# Patient Record
Sex: FEMALE | Race: WHITE
[De-identification: names, ages, dates, MRNs, and addresses within clinical notes are randomized per-mention and may not be internally consistent; named-entity substitution may affect disease eponyms.]

---

## 2016-11-23 NOTE — HP
DATE OF ADMISSION:  2016

 

Patient to be admitted to the [facility] on 2016.  

 

HISTORY:  This is a 40-year-old woman who only recently had developed a every severe

bout of lower chest and epigastric discomfort radiating to her back and associated

with nausea and vomiting.  This led to an evaluation at [facility], which

included a CT scan of the abdomen and pelvis as well as an abdominal ultrasound. 

Ultrasound evaluation completed 2016, demonstrated a distended

gallbladder with a mobile gallstone.  Gallbladder wall measured 1 to 2 mm in

thickness.  There was no appreciable pericholecystic fluid at that time.  The biliary

ductal structures were of normal caliber.  CT evaluation added  no additional

pertinent information.

 

On close questioning, the patient has had multiple bouts of right upper quadrant mild

discomfort in the past.  On close questioning, she does have a fatty food intolerance

by history  and did consume a significantly large fatty meal the night she became

very symptomatic.  There has been no unintentional weight loss of history of

jaundice.

 

The patient's past medical history is significant for hypertension, GERD, and

underlying thyroid disorder.  No history of hypercholesterolemia; heart disease;

diabetes; respiratory, renal or hepatic insufficiency.

 

Past surgical history significant for  section in .

 

ALLERGIES:  None known.

 

REGULAR MEDICATIONS:  Losartan; Synthroid; Lasix 20 mg ,  and

.

 

SOCIAL HISTORY:  Negative tobacco, negative alcohol.

 

FAMILY HISTORY:  Father age 75, history of diabetes, hypercholesterolemia,

hypertension.  Mother, history of CVA at age 35.  Siblings well, one with an

underlying thyroid disorder.

 

REVIEW OF SYSTEMS:  Nil.

 

PHYSICAL EXAMINATION:  Abdomen obese, soft, nontender.  No palpable abnormalities.

 

IMPRESSION:  Symptomatic cholelithiasis/chronic cholecystitis.

 

PLAN:  Laparoscopic cholecystectomy/possible open.

 

Indications, alternatives, possible complications reviewed.  Consent obtained.

 

Patient to be seen preoperatively by Dr. Rubin.  Please refer to his notes for those

medical details. 

 

 

SYLVESTER VALDEZ M.D.

 

[id number]

DD: 2016 18:04

DT: 2016 20:30

Job #:  [id number]

cc:  Vladislav Rubin MD

## 2017-02-01 ENCOUNTER — HOSPITAL ENCOUNTER (OUTPATIENT)
Dept: HOSPITAL 74 - FASU | Age: 42
LOS: 1 days | Discharge: HOME | End: 2017-02-02
Attending: SURGERY
Payer: COMMERCIAL

## 2017-02-01 VITALS — BODY MASS INDEX: 44.4 KG/M2

## 2017-02-01 DIAGNOSIS — K80.10: Primary | ICD-10-CM

## 2017-02-01 PROCEDURE — 0FT44ZZ RESECTION OF GALLBLADDER, PERCUTANEOUS ENDOSCOPIC APPROACH: ICD-10-PCS | Performed by: SURGERY

## 2017-02-01 RX ADMIN — FAMOTIDINE SCH MLS/HR: 20 INJECTION, SOLUTION INTRAVENOUS at 21:26

## 2017-02-01 NOTE — OP
DATE OF OPERATION:  02/01/2017 

 

PREOPERATIVE DIAGNOSIS:  Chronic cholecystitis/cholelithiasis.

 

POSTOPERATIVE DIAGNOSIS:  Chronic cholecystitis/cholelithiasis.

 

PROCEDURE PERFORMED:  Laparoscopic cholecystectomy.

 

OPERATING SURGEON:  Brock Chappell MD

 

FIRST ASSISTANT:  Thierry Barbosa MD

 

ANESTHESIA: General. Dr. Magdalene Ocampo 

 

HISTORY:  A 40-year-old woman who presents to the hospital with chronic

cholecystitis/cholelithiasis/biliary colic.  The patient for laparoscopic

cholecystectomy.

 

Indications, alternatives, possible complications were reviewed and consent was

obtained.

 

PROCEDURE IN DETAIL: With the patient in the supine position, after general

anesthesia, the abdomen was prepped and draped in a sterile fashion using

chlorhexidine.  Small infraumbilical transverse incision was made through which 
a

Veress needle was placed into the abdominal cavity.  The cavity was inflated to 
an

adequate pressure and volume using CO2 gas.  

 

The Veress needle was removed.  An 11-mm trocar port was place through the 
umbilical

wound. The cameras lens was passed through this port and the intraabdominal 
cavity

was visualized.  Under direct visualization, two 5-mm right anterolateral ports 
were

placed through which clamps were passed to maintain traction on the gallbladder 
and

aid in the dissection.  An 11-mm port was placed in the epigastrium in the 
midline. 

The operating instruments were passed through this port.

 

Limited exploration of the abdomen, particularly considering the large extent of

intraabdominal fat, revealed no significant findings other than adhesions about 
the

gallbladder.  These were taken down under direct vision exposing the entire

gallbladder and hepatoduodenal ligament.  

 

The hepatoduodenal ligament was incised.  The cystic duct was identified.  The 
cystic

duct bile duct junction was noted.  The cystic duct was clipped proximally and

distally and divided.  The adjacent cystic artery was managed similarly.  

 

The gallbladder was then removed from the gallbladder bed using the 
electrocautery

device.  Ultimately, the gallbladder was disconnected.  The bed was inspected 
and

adequate hemostasis noted.  All ports sites were visualize and the ports removed

under direct vision noted at the port sites.  

 

Ultimately the gallbladder was passed through the umbilical port and delivered

without difficulty.  

 

The fascia at the level of the umbilicus was closed using interrupted No. 1 
Vicryl

suture.  All skin wounds were closed using subcuticular 4-0 Biosyn sutures.  

 

INSTRUMENT COUNTS:  Correct.

 

ESTIMATED BLOOD LOSS:  Minimal.

 

SPECIMEN:  Gallbladder. 

 

DRAINS:  None.

 

The patient tolerated the procedure and the procedure was terminated.  

 

 

MADDIE [id number]

DD: 02/01/2017 12:10

DT: 02/01/2017 12:43

Job #:  [id number]

MTDD

## 2017-02-02 VITALS — SYSTOLIC BLOOD PRESSURE: 114 MMHG | DIASTOLIC BLOOD PRESSURE: 67 MMHG | HEART RATE: 86 BPM | TEMPERATURE: 97.3 F

## 2017-02-02 RX ADMIN — FAMOTIDINE SCH MLS/HR: 20 INJECTION, SOLUTION INTRAVENOUS at 09:32

## 2017-02-03 NOTE — PATH
Surgical Pathology Report



Patient Name:  YOMI PIZANO

Accession #:  

Med. Rec. #:  [id number]                                                        

   /Age/Gender:  1975 (Age: 41) / F

Account:  [id number]                                                          

             Location: [facility] 

Taken:  2017

Received:  2017

Reported:  2/3/2017

Physicians:  Brock Chappell M.D.

  



Specimen(s) Received

 GALLBLADDER 





Clinical History

Calculus of gallbladder with chronic cholecystitis







Final Diagnosis

GALLBLADDER, CHOLECYSTECTOMY:

CHRONIC CHOLECYSTITIS AND CHOLELITHIASIS.





***Electronically Signed***

Giovanni Lee M.D.





Gross Description

Received in formalin, labeled "gallbladder," is a 9.0 x 3.5 x 3.3 cm.

gallbladder with a 0.3 cm. in length portion of cystic duct attached. The outer

surface is tan grey and varies from smooth to shaggy. The lumen contains green,

tenacious bile as well as 2 yellow, irregular choleliths measuring 0.4 and 1.7

cm in greatest dimension. The mucosa is green and velvety. The wall of the

gallbladder averages 0.1 cm. in thickness. Representative sections are submitted

in one cassette.

## 2017-03-09 ENCOUNTER — HOSPITAL ENCOUNTER (EMERGENCY)
Dept: HOSPITAL 74 - JER | Age: 42
Discharge: HOME | End: 2017-03-09
Payer: COMMERCIAL

## 2017-03-09 VITALS — BODY MASS INDEX: 43.8 KG/M2

## 2017-03-09 VITALS — DIASTOLIC BLOOD PRESSURE: 70 MMHG | HEART RATE: 100 BPM | SYSTOLIC BLOOD PRESSURE: 140 MMHG | TEMPERATURE: 98.1 F

## 2017-03-09 DIAGNOSIS — N39.0: ICD-10-CM

## 2017-03-09 DIAGNOSIS — E66.01: ICD-10-CM

## 2017-03-09 DIAGNOSIS — I10: ICD-10-CM

## 2017-03-09 DIAGNOSIS — E87.6: ICD-10-CM

## 2017-03-09 DIAGNOSIS — K52.9: Primary | ICD-10-CM

## 2017-03-09 DIAGNOSIS — E03.9: ICD-10-CM

## 2017-03-09 LAB
ALBUMIN SERPL-MCNC: 3.6 G/DL (ref 3.4–5)
ALP SERPL-CCNC: 108 U/L (ref 45–117)
ALT SERPL-CCNC: 34 U/L (ref 12–78)
ANION GAP SERPL CALC-SCNC: 12 MMOL/L (ref 8–16)
APPEARANCE UR: (no result)
AST SERPL-CCNC: 25 U/L (ref 15–37)
BASOPHILS # BLD: 0.5 % (ref 0–2)
BILIRUB SERPL-MCNC: 0.5 MG/DL (ref 0.2–1)
BILIRUB UR STRIP.AUTO-MCNC: NEGATIVE MG/DL
CALCIUM SERPL-MCNC: 9 MG/DL (ref 8.5–10.1)
CO2 SERPL-SCNC: 29 MMOL/L (ref 21–32)
COLOR UR: YELLOW
CREAT SERPL-MCNC: 0.5 MG/DL (ref 0.55–1.02)
DEPRECATED RDW RBC AUTO: 14.2 % (ref 11.6–15.6)
EOSINOPHIL # BLD: 1.2 % (ref 0–4.5)
GLUCOSE SERPL-MCNC: 92 MG/DL (ref 74–106)
KETONES UR QL STRIP: (no result)
LEUKOCYTE ESTERASE UR QL STRIP.AUTO: (no result)
MCH RBC QN AUTO: 25.6 PG (ref 25.7–33.7)
MCHC RBC AUTO-ENTMCNC: 33.5 G/DL (ref 32–36)
MCV RBC: 76.5 FL (ref 80–96)
MUCOUS THREADS URNS QL MICRO: (no result)
NEUTROPHILS # BLD: 71.6 % (ref 42.8–82.8)
NITRITE UR QL STRIP: NEGATIVE
PH UR: 7 [PH] (ref 5–8)
PLATELET # BLD AUTO: 210 K/MM3 (ref 134–434)
PMV BLD: 8.2 FL (ref 7.5–11.1)
PROT SERPL-MCNC: 7.5 G/DL (ref 6.4–8.2)
PROT UR QL STRIP: NEGATIVE
PROT UR QL STRIP: NEGATIVE
RBC # BLD AUTO: 4 /HPF (ref 0–3)
RBC # UR STRIP: (no result) /UL
SP GR UR: 1.02 (ref 1–1.03)
UROBILINOGEN UR STRIP-MCNC: NEGATIVE E.U./DL (ref 0.2–1)
WBC # BLD AUTO: 8.7 K/MM3 (ref 4–10)
WBC # UR AUTO: 18 /HPF (ref 3–5)

## 2017-03-09 PROCEDURE — 3E033GC INTRODUCTION OF OTHER THERAPEUTIC SUBSTANCE INTO PERIPHERAL VEIN, PERCUTANEOUS APPROACH: ICD-10-PCS

## 2017-03-09 NOTE — PDOC
Rapid Medical Evaluation


Chief Complaint: Nausea/Vomiting


Time Seen by Provider: 03/09/17 16:20


Medical Evaluation: 


 Allergies











Allergy/AdvReac Type Severity Reaction Status Date / Time


 


No Known Allergies Allergy   Verified 03/09/17 16:20














03/09/17 16:25


I have performed a brief in-person evaluation of this patient.


The patient presents with a chief complaint of: epigastric pain x 2 days, 

intermittent, + nausea/diarrhea, recent cholecystectomy


Pertinent physical exam findings:+ epigastric tenderness


I have ordered the following: cbc, cmp, lipase, abd/pelvis CT


The patient will proceed to the ED for further evaluation.

## 2017-03-09 NOTE — PDOC
History of Present Illness





- General


History Source: Patient, Old Records


Exam Limitations: No Limitations





- History of Present Illness


Initial Comments: 


17 17:46


41-year-old female with past medical history of hypertension, hypothyroidism, 

obesity, status post cholecystectomy in early February at [facility] sent in 

by her primary care physician Dr. Rubin for CT scan of the abdomen pelvis. The 

patient reports 2 days of intermittent sharp epigastric pain. Associated with 

some nausea but denies vomiting. Reports several loose stools but denies 

fevers. Denies sick contacts. Came into the ER to rule out potentially retained 

stone.





<Casey Amaro - Last Filed: 17 22:11>





- General


History Source: Patient


Exam Limitations: No Limitations





<Refugio Potts - Last Filed: 17 22:29>





- General


Chief Complaint: Pain, Acute


Stated Complaint: SENT BY PCP


Time Seen by Provider: 17 16:20





Past History





<Casey Amaro - Last Filed: 17 22:11>





- Past Medical History


Anemia: No


Asthma: No


Cancer: No


Cardiac Disorders: No


CVA: No


COPD: No


CHF: No


Dementia: No


Diabetes: No


GI Disorders: No


 Disorders: No


HTN: Yes (DX )


Hypercholesterolemia: Yes (DIET CONTROLLED)


Liver Disease: No


Seizures: No


Thyroid Disease: Yes (HYPOTHYROIDISM)





- Surgical History


Abdominal Surgery: Yes (- )


Appendectomy: No


Cardiac Surgery: No


Cholecystectomy: No


Lung Surgery: No


Neurologic Surgery: No


Orthopedic Surgery: No





- Immunization History


Immunization Up to Date: Yes





- Psycho/Social/Smoking Cessation Hx


Anxiety: No


Suicidal Ideation: No


Smoking Status: No


Smoking History: Never smoked


Have you smoked in the past 12 months: No


Number of Cigarettes Smoked Daily: 0


Hx Alcohol Use: No


Drug/Substance Use Hx: No


Substance Use Type: None


Hx Substance Use Treatment: No





<Refugio Potts - Last Filed: 17 22:29>





- Past Medical History


Allergies/Adverse Reactions: 


 Allergies











Allergy/AdvReac Type Severity Reaction Status Date / Time


 


No Known Allergies Allergy   Verified 17 16:20











Home Medications: 


Ambulatory Orders





Furosemide 20 mg PO DAILY 17 


Levothyroxine [Synthroid -] 50 mcg PO DAILY 17 


Losartan/Hydrochlorothiazide [Losartan-Hctz 100-25 mg Tab] 1 tab PO DAILY  


Cephalexin [Keflex] 500 mg PO BID #14 capsule 17 


Mag Hydrox/Al Hydrox/Simeth [Mylanta Suspension -] 30 ml PO Q6H PRN #1 bottle  


Ranitidine HCl [Zantac] 150 mg PO BID PRN #14 tablet 17 











**Review of Systems





- Review of Systems


Able to Perform ROS?: Yes


Comments:: 


17 17:46


GENERAL/CONSTITUTIONAL: No fever or chills. No weakness.


HEAD, EYES, EARS, NOSE AND THROAT: No change in vision. No ear pain or 

discharge. No sore throat.


CARDIOVASCULAR: No chest pain or shortness of breath.


RESPIRATORY: No cough, wheezing, or hemoptysis.


GASTROINTESTINAL: (+) epigastric pain, diarrhea, nausea. No vomiting or 

constipation.


GENITOURINARY: No dysuria, frequency, or change in urination.


MUSCULOSKELETAL: No joint or muscle swelling or pain. No neck or back pain.


SKIN: No rash


NEUROLOGIC: No headache, vertigo, loss of consciousness, or change in strength/

sensation.


ENDOCRINE: No increased thirst. No abnormal weight change.


HEMATOLOGIC/LYMPHATIC: No anemia, easy bleeding, or history of blood clots.


ALLERGIC/IMMUNOLOGIC: No hives or skin allergy.





<Casey Amaro - Last Filed: 17 22:11>





*Physical Exam





- Vital Signs


 Last Vital Signs











Temp Pulse Resp BP Pulse Ox


 


 97.9 F   115 H  19   137/79   99 


 


 17 16:20  17 16:20  17 16:20  17 16:20  17 17:42














- Physical Exam


Comments: 


17 17:46


GENERAL: (+) Obese. Awake, alert, and fully oriented, in no acute distress


HEAD: No signs of trauma


EYES: PERRLA, EOMI, sclera anicteric, conjunctiva clear


ENT: Auricles normal inspection, hearing grossly normal, nares patent, 

oropharynx clear without exudates. Moist mucosa


NECK: Normal ROM, supple, no lymphadenopathy, JVD, or masses


LUNGS: Breath sounds equal, clear to auscultation bilaterally.  No wheezes, and 

no crackles


HEART: Regular rate and rhythm, normal S1 and S2, no murmurs, rubs or gallops


ABDOMEN: (+) Mild tenderness to the epigastric. Soft, normoactive bowel sounds.

  No guarding, no rebound.  No masses


EXTREMITIES: Normal range of motion, no edema.  No clubbing or cyanosis. No 

cords, erythema, or tenderness


NEUROLOGICAL: Cranial nerves II through XII grossly intact.  Normal speech, 

normal gait


SKIN: Warm, Dry, normal turgor, no rashes or lesions noted.








<Caesy Amaro - Last Filed: 17 22:11>





- Vital Signs


 Last Vital Signs











Temp Pulse Resp BP Pulse Ox


 


 97.9 F   115 H  19   137/79   97 


 


 17 16:20  17 16:20  17 16:20  17 16:20  17 16:20














<Refugio Potts - Last Filed: 17 22:29>





**Heart Score/ECG Review


  ** #1


ECG reviewed & interpreted by me at: 17:40





17 17:51


, no std/christopher, TWI III, normal axis, normal intervals,  msec





<Refugio Potts - Last Filed: 17 22:29>





ED Treatment Course





- LABORATORY


CBC & Chemistry Diagram: 


 17 17:20





 17 17:20





- ADDITIONAL ORDERS


Additional order review: 


 











  17





  17:20


 


RBC  5.29 H


 


MCV  76.5 L


 


MCHC  33.5


 


RDW  14.2


 


MPV  8.2


 


Neutrophils %  71.6


 


Lymphocytes %  19.4  D


 


Monocytes %  7.3  D


 


Eosinophils %  1.2  D


 


Basophils %  0.5














- RADIOLOGY


Radiology Studies Ordered: 


17 22:11


EXAM:ABDOMEN PELVIS CT WITH CONTRAST 


Epigastric pain. CT scan of the abdomen and pelvis following oral and 

intravenous contrast. Coronal and sagittal reformatted images were obtained 96 

cc of Omnipaque 350 was intravenously injected 


Comparison: Compared to prior CT scan of the abdomen pelvis dated 2012 

Visualized lung base appears unremarkable and the heart is within normal limits 

in size. The liver is borderline in craniocaudal length mild decreased 

attenuation suggestive of a fatty liver. The spleen, pancreas and left adrenal 

gland appear unremarkable. Postcholecystectomy surgical metallic clips are 

present. There is an approximately 1.1 cm low-attenuation nodule inseparable 

from the right adrenal gland which is better visualized on this examination, 

likely representing an adrenal adenoma. Tiny left renal cyst measuring 7 mm and 

a tiny right renal cyst measuring 7 mm, as well. Both kidneys are otherwise 

normally enhanced without evidence of hydronephrosis or hydroureter. Previously 

visualized tiny suspected stone in the distal right ureter is no longer seen. 

Partially distended urinary bladder without wall thickening. There is no 

evidence of small bowel obstruction. No enlarged mesenteric or retroperitoneal 

lymph nodes are identified. No free fluid or free air in the abdomen pelvis. 

Interval surgical metallic clip along left lateral margin of the uterus 1.4 cm. 

Slightly lobulated posterior margin of the uterus suggestive of a small 

fibroid. Low-attenuation density in the right lower pelvis likely representing 

an ovarian cyst/dominant follicle Visualized osseous structures appear intact 


Impression: Borderline hepatomegaly with fatty infiltration. Please correlate 

with liver enzymes. Small right and left renal cyst. There is no evidence of 

hydroureteronephrosis, bilaterally. Previously visualized likely tiny left 

renal cyst is not appreciated on this exam. Fibroid uterus


Reported By: Kevin Louis MD 





<Casey Amaro - Last Filed: 17 22:11>





- LABORATORY


CBC & Chemistry Diagram: 


 17 17:20





 17 17:20





- ADDITIONAL ORDERS


Additional order review: 


 











  17





  17:20


 


RBC  5.29 H


 


MCV  76.5 L


 


MCHC  33.5


 


RDW  14.2


 


MPV  8.2


 


Neutrophils %  71.6


 


Lymphocytes %  19.4  D


 


Monocytes %  7.3  D


 


Eosinophils %  1.2  D


 


Basophils %  0.5














<Refugio Potts - Last Filed: 17 22:29>





Medical Decision Making





- Medical Decision Making


17 17:41





A portion of this note was documented by scribe services under my direction. I 

have reviewed the details of the note, within reason, and agree with the 

documentation with the following case summary and management plan written by 

me. 





Patient treated in the ED.





Nursing notes are reviewed and incorporated into the medical decision-making.


Vital signs reviewed.





Peripheral IV access obtained by the nurse, laboratory studies are drawn and 

sent, reviewed and interpreted by myself. 





 Vital Signs











Temp Pulse Resp BP Pulse Ox


 


 97.9 F   115 H  19   137/79   97 


 


 17 16:20  17 16:20  17 16:20  17 16:20  17 16:20








41-year-old female with past medical history of hypertension, hypothyroidism, 

obesity, status post cholecystectomy in early February at [facility] sent in 

by her primary care physician Dr. Rubin for CT scan of the abdomen pelvis. The 

patient reports 2 days of intermittent sharp epigastric pain. Associated with 

some nausea but denies vomiting. Reports several loose stools but denies 

fevers. Denies sick contacts. Came into the ER to rule out potentially retained 

stone.





We'll obtain labs, CAT scan and reassess.





17 22:27





Borderline hepatomegaly with fatty infiltration. Small right and left renal 

cyst. There is no evidence of hydrouternephrosis, bilaterally. Previously 

visualized likely tiny left renal cyst is not appreciated on this exam. Fibroid 

uterus.





 CBC, BMP





 17 17:20 





 17 17:20 





 CMP











Sodium  140 mmol/L (136-145)   17  17:20    


 


Potassium  3.0 mmol/L (3.5-5.1)  L  17  17:20    


 


Chloride  99 mmol/L ()   17  17:20    


 


Carbon Dioxide  29 mmol/L (21-32)   17  17:20    


 


Anion Gap  12  (8-16)   17  17:20    


 


BUN  10 mg/dL (7-18)   17  17:20    


 


Creatinine  0.5 mg/dL (0.55-1.02)  L  17  17:20    


 


Creat Clearance w eGFR  > 60  (>60)   17  17:20    


 


Random Glucose  92 mg/dL ()   17  17:20    


 


Calcium  9.0 mg/dL (8.5-10.1)   17  17:20    


 


Total Bilirubin  0.5 mg/dL (0.2-1.0)  D 17  17:20    


 


AST  25 U/L (15-37)  D 17  17:20    


 


ALT  34 U/L (12-78)  D 17  17:20    


 


Alkaline Phosphatase  108 U/L ()   17  17:20    


 


Total Protein  7.5 g/dl (6.4-8.2)   17  17:20    


 


Albumin  3.6 g/dl (3.4-5.0)   17  17:20    


 


Lipase  88 U/L ()   17  17:20    


 


Serum Pregnancy, Qual  Negative   17  16:55    








 Urine Test Results











Urine Color  Yellow   17  19:20    


 


Urine Appearance  Cloudy   17  19:20    


 


Urine pH  7.0  (5.0-8.0)   17  19:20    


 


Ur Specific Gravity  1.018  (1.001-1.035)   17  19:20    


 


Urine Protein  Negative  (NEGATIVE)   17  19:20    


 


Urine Glucose (UA)  Negative  (NEGATIVE)   17  19:20    


 


Urine Ketones  Trace  (NEGATIVE)  H  17  19:20    


 


Urine Blood  2+  (NEGATIVE)  H  17  19:20    


 


Urine Nitrite  Negative  (NEGATIVE)   17  19:20    


 


Urine Bilirubin  Negative  (NEGATIVE)   17  19:20    


 


Ur Leukocyte Esterase  1+  (NEGATIVE)  H  17  19:20    


 


Urine RBC  4 /hpf (0-3)   17  19:20    


 


Urine WBC  18 /hpf (3-5)   17  19:20    


 


Ur Epithelial Cells  Many /hpf (FEW)   17  19:20    


 


Urine Mucus  Few   17  19:20    








Potassium repletion ordered. PO potassium.


UA positive for UTI. Keflex ordered.





I had given the results to the patient. At this time, there is no acute process 

and the patient may follow up as an outpatient. Keflex prescription was 

ordered. GI referral was given. Return precautions given.


Patient verbalizes understanding and agrees with plan. Likely gastroenteritis.











<Refugio Potts - Last Filed: 17 22:29>





*DC/Admit/Observation/Transfer





- Attestations


Scribe Attestion: 


17 17:46


Documentation prepared by Casey Amaro, acting as medical scribe for Refugio Potts MD.





<Casey Amaro - Last Filed: 17 22:11>





- Discharge Dispostion


Admit: No





<Refugio Potts - Last Filed: 17 22:29>


Diagnosis at time of Disposition: 


Abdominal pain


Qualifiers:


 Abdominal location: upper abdomen, unspecified Qualified Code(s): R10.10 - 

Upper abdominal pain, unspecified





- Discharge Dispostion


Disposition: HOME


Condition at time of disposition: Improved





- Prescriptions


Prescriptions: 


Cephalexin [Keflex] 500 mg PO BID #14 capsule


Mag Hydrox/Al Hydrox/Simeth [Mylanta Suspension -] 30 ml PO Q6H PRN #1 bottle


 PRN Reason: Abdominal Pain


Ranitidine HCl [Zantac] 150 mg PO BID PRN #14 tablet


 PRN Reason: Abdominal Pain





- Referrals


Referrals: 


Vladislav Rubin MD [Primary Care Provider] - 


Wesley Gutierres MD [Staff Physician] - 





- Patient Instructions


Printed Discharge Instructions:  DI for Urinary Tract Infection (UTI), DI for 

Abdominal Pain-Adult


Additional Instructions: 


Take 500 mg keflex every 12 hours for the next 7 days for UTI.


You may take the maalox and/or zantac as prescribed as needed for abdominal 

pain.


Drink plenty of fluids.


Please take a copy of your results to your doctor.


Call Dr. Rubin and schedule a follow up.

## 2017-03-10 NOTE — EKG
Test Reason : 

Blood Pressure : ***/*** mmHG

Vent. Rate : 101 BPM     Atrial Rate : 101 BPM

   P-R Int : 168 ms          QRS Dur : 096 ms

    QT Int : 350 ms       P-R-T Axes : 018 043 018 degrees

   QTc Int : 453 ms

 

SINUS TACHYCARDIA

INCOMPLETE RBBB

WHEN COMPARED WITH ECG OF 20-JUL-2012 11:00,

NO SIGNIFICANT CHANGE WAS FOUND

Confirmed by JERMAIN LAW MD ([id number]) on 3/10/2017 9:59:34 AM

 

Referred By:             Confirmed By:JERMAIN LAW MD

## 2017-09-17 ENCOUNTER — HOSPITAL ENCOUNTER (EMERGENCY)
Dept: HOSPITAL 74 - JER | Age: 42
LOS: 1 days | Discharge: HOME | End: 2017-09-18
Payer: COMMERCIAL

## 2017-09-17 VITALS — SYSTOLIC BLOOD PRESSURE: 142 MMHG | DIASTOLIC BLOOD PRESSURE: 87 MMHG

## 2017-09-17 VITALS — TEMPERATURE: 98 F | HEART RATE: 102 BPM

## 2017-09-17 VITALS — BODY MASS INDEX: 44.4 KG/M2

## 2017-09-17 DIAGNOSIS — E03.9: ICD-10-CM

## 2017-09-17 DIAGNOSIS — I10: ICD-10-CM

## 2017-09-17 DIAGNOSIS — E78.00: ICD-10-CM

## 2017-09-17 DIAGNOSIS — R06.00: Primary | ICD-10-CM

## 2017-09-17 LAB
ALBUMIN SERPL-MCNC: 4.1 G/DL (ref 3.4–5)
ALP SERPL-CCNC: 114 U/L (ref 45–117)
ALT SERPL-CCNC: 82 U/L (ref 12–78)
ANION GAP SERPL CALC-SCNC: 10 MMOL/L (ref 8–16)
AST SERPL-CCNC: 47 U/L (ref 15–37)
BASOPHILS # BLD: 0.3 % (ref 0–2)
BILIRUB SERPL-MCNC: 0.4 MG/DL (ref 0.2–1)
CALCIUM SERPL-MCNC: 9.5 MG/DL (ref 8.5–10.1)
CO2 SERPL-SCNC: 27 MMOL/L (ref 21–32)
CREAT SERPL-MCNC: 0.8 MG/DL (ref 0.55–1.02)
DEPRECATED RDW RBC AUTO: 14.3 % (ref 11.6–15.6)
EOSINOPHIL # BLD: 0.1 % (ref 0–4.5)
GLUCOSE SERPL-MCNC: 130 MG/DL (ref 74–106)
MCH RBC QN AUTO: 25.7 PG (ref 25.7–33.7)
MCHC RBC AUTO-ENTMCNC: 33.3 G/DL (ref 32–36)
MCV RBC: 77 FL (ref 80–96)
NEUTROPHILS # BLD: 85.4 % (ref 42.8–82.8)
PLATELET # BLD AUTO: 256 K/MM3 (ref 134–434)
PMV BLD: 8.1 FL (ref 7.5–11.1)
PROT SERPL-MCNC: 8.4 G/DL (ref 6.4–8.2)
WBC # BLD AUTO: 14.8 K/MM3 (ref 4–10)

## 2017-09-17 PROCEDURE — 3E033GC INTRODUCTION OF OTHER THERAPEUTIC SUBSTANCE INTO PERIPHERAL VEIN, PERCUTANEOUS APPROACH: ICD-10-PCS

## 2017-09-17 PROCEDURE — 3E0F7GC INTRODUCTION OF OTHER THERAPEUTIC SUBSTANCE INTO RESPIRATORY TRACT, VIA NATURAL OR ARTIFICIAL OPENING: ICD-10-PCS

## 2017-09-17 PROCEDURE — 3E0337Z INTRODUCTION OF ELECTROLYTIC AND WATER BALANCE SUBSTANCE INTO PERIPHERAL VEIN, PERCUTANEOUS APPROACH: ICD-10-PCS

## 2017-09-17 NOTE — PDOC
Attending Attestation





- Resident


Resident Name: Ramez Lay





- ED Attending Attestation


I have performed the following: I have examined & evaluated the patient, The 

case was reviewed & discussed with the resident, I agree w/resident's findings 

& plan, Exceptions are as noted





- HPI


HPI: 





09/17/17 21:51


42 yo female p/w several weeks of  dyspnea.


She was treated for bronchitis in August and followed up  with her PCP who 

placed her on a steroid taper that she currently is on.





- Physicial Exam


PE: 





09/19/17 20:25


pleae see the physical exam above





- Medical Decision Making


09/17/17 21:52


NOTE Initial vital signs recorded in triage showed hypotension and hypoxia-none 

of these were found when pt was in the main Ed. Here her pulse ox was 97% on 

room air and her BP was 142/87


Pt currently on steroid taper and antibiotics.


09/17/17 21:55


obese 42 yo female p/w shortness of breath-feels she cannot get a good breath,

"can't catch my breath"


HEENT : wnl


neck  :[email or web address].no jvd


lungs ; cta b/l


cvr : [id number]


abd:  protuberant,nontender


ext : no edema


skin:no lesions,no erythema


neuro : axox3,ambulatory,no gross focal neuro deficits





09/17/17 21:59


Dr solomon sent her for nonconstrast CT of CHEST on 9/9/17 that showed  faint 

patchy localized airspce disease left perihilar region,right lower lobe and 

right upper lobe


-NEGATIVE Dupplex dopplers, d dimer in the 200s.  


-pt comfortable and she is to finish her  medications and see her PCP this week


09/18/17 22:11








09/19/17 20:24


pt will follow up with PCP this week

## 2017-09-17 NOTE — PDOC
History of Present Illness





- General


Chief Complaint: Respiratory


Stated Complaint: S.O.B


Time Seen by Provider: 17 21:09


History Source: Patient


Exam Limitations: No Limitations





- History of Present Illness


Initial Comments: 





17 22:01


The patient is a 41F with a PMH of HTN and hypothyroidism who presents to the 

ED with complaints of difficulty breathing. The patient states that 3 weeks ago 

she felt a cold, went to an urgent care and was given a z-pack and prednisone. 

She spoke with Dr. Rubin who told her to follow up with him if she did not 

feel better. She wasn't feeling better after this treatment and Dr. Rubin 

ordered a CT of her chest which showed airspace disease of either inflammatory 

or infectious etiology. Dr. Rubin prescribed 9 days of prednisone. She then 

saw Dr. Gonzalez for pressure in her urinary bladder and he prescribed 

phenazopyradine and nitrofurantoin. Today she is complaining of SOB, nausea, 

and chronic low back pain.





LMP: on it now





Past History





- Past Medical History


Allergies/Adverse Reactions: 


 Allergies











Allergy/AdvReac Type Severity Reaction Status Date / Time


 


No Known Allergies Allergy   Verified 17 21:12











Home Medications: 


Ambulatory Orders





Furosemide 20 mg PO DAILY 17 


Levothyroxine [Synthroid -] 50 mcg PO DAILY 17 


Losartan/Hydrochlorothiazide [Losartan-Hctz 100-25 mg Tab] 1 tab PO DAILY  


Cephalexin [Keflex] 500 mg PO BID #14 capsule 17 


Mag Hydrox/Al Hydrox/Simeth [Mylanta Suspension -] 30 ml PO Q6H PRN #1 bottle  


Ranitidine HCl [Zantac] 150 mg PO BID PRN #14 tablet 17 








Anemia: No


Asthma: No


Cancer: No


Cardiac Disorders: No


CVA: No


COPD: No


CHF: No


Dementia: No


Diabetes: No


GI Disorders: No


 Disorders: No


HTN: Yes (DX )


Hypercholesterolemia: Yes (DIET CONTROLLED)


Liver Disease: No


Seizures: No


Thyroid Disease: Yes (HYPOTHYROIDISM)





- Surgical History


Abdominal Surgery: Yes (- )


Appendectomy: No


Cardiac Surgery: No


Cholecystectomy: No


Lung Surgery: No


Neurologic Surgery: No


Orthopedic Surgery: No





- Immunization History


Immunization Up to Date: Yes





- Psycho/Social/Smoking Cessation Hx


Anxiety: No


Suicidal Ideation: No


Smoking Status: No


Smoking History: Never smoked


Have you smoked in the past 12 months: No


Number of Cigarettes Smoked Daily: 0


Hx Alcohol Use: No


Drug/Substance Use Hx: No


Substance Use Type: None


Hx Substance Use Treatment: No





**Review of Systems





- Review of Systems


Able to Perform ROS?: Yes


Is the patient limited English proficient: No


Constitutional: No: Chills, Fever


Respiratory: Yes: Shortness of Breath


Cardiac (ROS): Yes: Chest Pain


ABD/GI: Yes: Nausea, Vomiting.  No: Constipated, Diarrhea, Other (abd pain)


: Yes: Other (pressure).  No: Burning, Dysuria, Discharge


Neurological: No: Headache, Numbness, Tingling, Weakness





*Physical Exam





- Vital Signs


 Last Vital Signs











Temp Pulse Resp BP Pulse Ox


 


 98 F   102 H  20   142/87   97 


 


 17 21:08  17 21:08  17 21:08  17 21:42  17 21:42














- Physical Exam


General Appearance: Yes: Nourished, Appropriately Dressed, Obese


HEENT: positive: Normal Voice, Hearing Grossly Normal


Respiratory/Chest: positive: Lungs Clear, Normal Breath Sounds.  negative: 

Chest Tender, Respiratory Distress, Crackles, Rales, Rhonchi, Stridor, Wheezing


Cardiovascular: positive: Regular Rhythm, Regular Rate, S1, S2.  negative: 

Diastolic Murmur, Systolic Murmur


Gastrointestinal/Abdominal: positive: Flat, Soft.  negative: Tender, Distended, 

Guarding, Rebound, Tenderness


Musculoskeletal: negative: CVA Tenderness, CVA Tenderness (R), CVA Tenderness (L

)


Integumentary: positive: Dry, Warm.  negative: Cold, Clammy, Swelling


Neurologic: positive: Fully Oriented, Alert, Normal Mood/Affect, Normal Response

, Motor Strength /5





ED Treatment Course





- LABORATORY


CBC & Chemistry Diagram: 


 17 21:45





 17 22:40





- ADDITIONAL ORDERS


Additional order review: 


 











  17





  21:45


 


RBC  5.69 H


 


MCV  77.0 L


 


MCHC  33.3


 


RDW  14.3


 


MPV  8.1


 


Neutrophils %  85.4 H


 


Lymphocytes %  8.9  D


 


Monocytes %  5.3


 


Eosinophils %  0.1  D


 


Basophils %  0.3














Medical Decision Making





- Medical Decision Making


17 22:18


The patient is a 41F who is presenting with difficulty breathing and suprapubic 

pressure. I have ordered labs and duoneb treatment and will reassess when they 

return.





17 23:54


Patient signed out to Dr. Castañeda.

## 2017-09-18 LAB
APPEARANCE UR: (no result)
BILIRUB UR STRIP.AUTO-MCNC: NEGATIVE MG/DL
COLOR UR: YELLOW
HYALINE CASTS URNS QL MICRO: 9 /LPF
KETONES UR QL STRIP: NEGATIVE
LEUKOCYTE ESTERASE UR QL STRIP.AUTO: NEGATIVE
MUCOUS THREADS URNS QL MICRO: (no result)
NITRITE UR QL STRIP: NEGATIVE
PH UR: 5 [PH] (ref 5–8)
PROT UR QL STRIP: (no result)
PROT UR QL STRIP: NEGATIVE
RBC # BLD AUTO: 2 /HPF (ref 0–3)
RBC # UR STRIP: NEGATIVE /UL
SP GR UR: >= 1.03 (ref 1–1.02)
UROBILINOGEN UR STRIP-MCNC: NEGATIVE MG/DL (ref 0.2–1)
WBC # UR AUTO: 15 /HPF (ref 3–5)

## 2017-09-18 NOTE — EKG
Test Reason : 

Blood Pressure : ***/*** mmHG

Vent. Rate : 100 BPM     Atrial Rate : 100 BPM

   P-R Int : 166 ms          QRS Dur : 096 ms

    QT Int : 376 ms       P-R-T Axes : 040 051 025 degrees

   QTc Int : 485 ms

 

NORMAL SINUS RHYTHM

WHEN COMPARED WITH ECG OF 09-MAR-2017 17:40,

NO SIGNIFICANT CHANGE WAS FOUND

Confirmed by FLACA DUBON MD ([id number]) on 9/18/2017 1:55:17 PM

 

Referred By:             Confirmed By:FLACA DUBON MD

## 2017-09-18 NOTE — PDOC
*Physical Exam





- Vital Signs


 Last Vital Signs











Temp Pulse Resp BP Pulse Ox


 


 98 F   102 H  20   142/87   98 


 


 09/17/17 21:08  09/17/17 21:08  09/17/17 21:08  09/17/17 21:42  09/17/17 21:45














ED Treatment Course





- LABORATORY


CBC & Chemistry Diagram: 


 09/17/17 21:45





 09/17/17 22:40





- ADDITIONAL ORDERS


Additional order review: 


 Laboratory  Results











  09/18/17 09/17/17 09/17/17





  00:30 22:40 22:40


 


D-Dimer   


 


Sodium    136


 


Potassium    3.1 L


 


Chloride    99


 


Carbon Dioxide    27


 


Anion Gap    10


 


BUN    19 H D


 


Creatinine    0.8  D


 


Creat Clearance w eGFR    > 60


 


Random Glucose    130 H D


 


Calcium    9.5


 


Total Bilirubin    0.4


 


AST    47 H D


 


ALT    82 H D


 


Alkaline Phosphatase    114


 


Total Protein    8.4 H


 


Albumin    4.1


 


Serum Pregnancy, Qual   Negative 


 


Urine Color  Yellow  


 


Urine Appearance  Slcloudy  


 


Urine pH  5.0  D  


 


Urine Protein  1+ H  


 


Urine Glucose (UA)  Negative  


 


Urine Ketones  Negative  


 


Urine Blood  Negative  


 


Urine Nitrite  Negative  


 


Urine Bilirubin  Negative  


 


Urine Urobilinogen  Negative  


 


Urine RBC  2  


 


Urine WBC  15  


 


Ur Epithelial Cells  Many  


 


Hyaline Casts  9  


 


Urine Mucus  Many  














  09/17/17 09/17/17





  21:45 21:45


 


D-Dimer   294 H


 


Sodium  Cancelled 


 


Potassium  Cancelled 


 


Chloride  Cancelled 


 


Carbon Dioxide  Cancelled 


 


Anion Gap  Cancelled 


 


BUN  Cancelled 


 


Creatinine  Cancelled 


 


Creat Clearance w eGFR  Cancelled 


 


Random Glucose  Cancelled 


 


Calcium  Cancelled 


 


Total Bilirubin  Cancelled 


 


AST  Cancelled 


 


ALT  Cancelled 


 


Alkaline Phosphatase  Cancelled 


 


Total Protein  Cancelled 


 


Albumin  Cancelled 


 


Serum Pregnancy, Qual  


 


Urine Color  


 


Urine Appearance  


 


Urine pH  


 


Urine Protein  


 


Urine Glucose (UA)  


 


Urine Ketones  


 


Urine Blood  


 


Urine Nitrite  


 


Urine Bilirubin  


 


Urine Urobilinogen  


 


Urine RBC  


 


Urine WBC  


 


Ur Epithelial Cells  


 


Hyaline Casts  


 


Urine Mucus  








 











  09/17/17





  21:45


 


RBC  5.69 H


 


MCV  77.0 L


 


MCHC  33.3


 


RDW  14.3


 


MPV  8.1


 


Neutrophils %  85.4 H


 


Lymphocytes %  8.9  D


 


Monocytes %  5.3


 


Eosinophils %  0.1  D


 


Basophils %  0.3














- RADIOLOGY


Radiology Studies Ordered: 














 Category Date Time Status


 


 DUPLEX VASCUL US-2LEGS [US] Stat Ultrasound  09/18/17 00:47 Taken














- Medications


Given in the ED: 


ED Medications














Discontinued Medications














Generic Name Dose Route Start Last Admin





  Trade Name Freq  PRN Reason Stop Dose Admin


 


Albuterol/Ipratropium  1 amp 09/17/17 22:00 09/17/17 22:07





  Duoneb -  NEB 09/17/17 22:01  1 amp





  ONCE ONE   Administration


 


Albuterol/Ipratropium  1 amp 09/18/17 00:48 09/18/17 00:55





  Duoneb -  NEB 09/18/17 00:49  1 amp





  ONCE ONE   Administration


 


Sodium Chloride  1,000 mls @ 1,000 mls/hr 09/17/17 23:50 09/18/17 00:06





  Normal Saline -  IV 09/18/17 00:49  1,000 mls/hr





  ASDIR STA   Administration


 


Ondansetron HCl  4 mg 09/17/17 22:34 09/17/17 22:36





  Zofran Injection  IVPUSH 09/17/17 22:35  4 mg





  ONCE ONE   Administration


 


Potassium Chloride  40 meq 09/17/17 23:15 09/17/17 23:28





  K-Dur -  PO 09/17/17 23:16  40 meq





  ONCE ONE   Administration














*DC/Admit/Observation/Transfer


Diagnosis at time of Disposition: 


 Cough in adult





Dyspnea


Qualifiers:


 Dyspnea type: shortness of breath Qualified Code(s): R06.02 - Shortness of 

breath; R06.00 - Dyspnea, unspecified; R06.01 - Orthopnea





- Discharge Dispostion


Disposition: HOME


Condition at time of disposition: Stable





- Patient Instructions


Printed Discharge Instructions:  DI for Acute Bronchitis, DI for Shortness of 

Breath


Additional Instructions: 


please follow up with your doctor this week


continue to take your steroids and breathing treatments

## 2023-09-13 ENCOUNTER — OFFICE (OUTPATIENT)
Dept: URBAN - METROPOLITAN AREA CLINIC 121 | Facility: CLINIC | Age: 48
Setting detail: OPHTHALMOLOGY
End: 2023-09-13
Payer: COMMERCIAL

## 2023-09-13 DIAGNOSIS — H35.033: ICD-10-CM

## 2023-09-13 DIAGNOSIS — H40.023: ICD-10-CM

## 2023-09-13 PROCEDURE — 92250 FUNDUS PHOTOGRAPHY W/I&R: CPT | Performed by: OPHTHALMOLOGY

## 2023-09-13 PROCEDURE — 76514 ECHO EXAM OF EYE THICKNESS: CPT | Performed by: OPHTHALMOLOGY

## 2023-09-13 PROCEDURE — 92004 COMPRE OPH EXAM NEW PT 1/>: CPT | Performed by: OPHTHALMOLOGY

## 2023-09-13 ASSESSMENT — SPHEQUIV_DERIVED
OD_SPHEQUIV: -4.875
OD_SPHEQUIV: -4.25
OS_SPHEQUIV: -4.375
OS_SPHEQUIV: -4.625

## 2023-09-13 ASSESSMENT — KERATOMETRY
OS_AXISANGLE_DEGREES: 090
OS_K2POWER_DIOPTERS: 44.50
OS_K1POWER_DIOPTERS: 44.25
METHOD_AUTO_MANUAL: AUTO
OD_K2POWER_DIOPTERS: 44.50
OD_AXISANGLE_DEGREES: 034
OD_K1POWER_DIOPTERS: 44.00

## 2023-09-13 ASSESSMENT — AXIALLENGTH_DERIVED
OD_AL: 25.3358
OS_AL: 25.1716
OD_AL: 25.058
OS_AL: 25.0612

## 2023-09-13 ASSESSMENT — REFRACTION_AUTOREFRACTION
OS_SPHERE: -4.50
OS_AXIS: 004
OD_CYLINDER: +0.50
OD_AXIS: 168
OD_SPHERE: -4.50
OS_CYLINDER: +0.25

## 2023-09-13 ASSESSMENT — PACHYMETRY
OD_CT_UM: 611
OS_CT_UM: 619
OD_CT_CORRECTION: -5
OS_CT_CORRECTION: -5

## 2023-09-13 ASSESSMENT — REFRACTION_CURRENTRX
OD_CYLINDER: +0.75
OS_CYLINDER: +0.75
OS_AXIS: 177
OD_AXIS: 169
OD_OVR_VA: 20/
OS_SPHERE: -5.50
OD_SPHERE: -5.50
OS_OVR_VA: 20/

## 2023-09-13 ASSESSMENT — REFRACTION_MANIFEST
OS_CYLINDER: +0.75
OD_SPHERE: -5.25
OD_AXIS: 169
OS_SPHERE: -5.00
OD_CYLINDER: +0.75
OS_AXIS: 177

## 2023-09-13 ASSESSMENT — CONFRONTATIONAL VISUAL FIELD TEST (CVF)
OD_FINDINGS: FULL
OS_FINDINGS: FULL

## 2023-09-13 ASSESSMENT — VISUAL ACUITY
OS_BCVA: 20/25
OD_BCVA: 20/25

## 2025-03-03 ENCOUNTER — RX ONLY (RX ONLY)
Age: 50
End: 2025-03-03

## 2025-03-03 ENCOUNTER — OFFICE (OUTPATIENT)
Facility: LOCATION | Age: 50
Setting detail: OPHTHALMOLOGY
End: 2025-03-03
Payer: COMMERCIAL

## 2025-03-03 DIAGNOSIS — H16.223: ICD-10-CM

## 2025-03-03 DIAGNOSIS — H35.033: ICD-10-CM

## 2025-03-03 DIAGNOSIS — H40.023: ICD-10-CM

## 2025-03-03 PROCEDURE — 92250 FUNDUS PHOTOGRAPHY W/I&R: CPT | Performed by: OPHTHALMOLOGY

## 2025-03-03 PROCEDURE — 92014 COMPRE OPH EXAM EST PT 1/>: CPT | Performed by: OPHTHALMOLOGY

## 2025-03-03 ASSESSMENT — PACHYMETRY
OD_CT_UM: 611
OD_CT_CORRECTION: -5
OS_CT_UM: 619
OS_CT_CORRECTION: -5

## 2025-03-03 ASSESSMENT — REFRACTION_MANIFEST
OS_AXIS: 177
OD_SPHERE: -5.25
OD_AXIS: 169
OS_CYLINDER: +0.75
OD_CYLINDER: +0.75
OS_SPHERE: -5.00

## 2025-03-03 ASSESSMENT — REFRACTION_AUTOREFRACTION
OS_AXIS: 180
OD_SPHERE: -5.00
OD_AXIS: 180
OS_SPHERE: -5.00
OD_CYLINDER: +0.50
OS_CYLINDER: 0.00

## 2025-03-03 ASSESSMENT — KERATOMETRY
OD_AXISANGLE_DEGREES: 033
METHOD_AUTO_MANUAL: AUTO
OD_K2POWER_DIOPTERS: 44.50
OS_K2POWER_DIOPTERS: 44.25
OS_AXISANGLE_DEGREES: 090
OS_K1POWER_DIOPTERS: 44.25
OD_K1POWER_DIOPTERS: 44.25

## 2025-03-03 ASSESSMENT — REFRACTION_CURRENTRX
OS_CYLINDER: +0.75
OD_CYLINDER: +0.75
OD_OVR_VA: 20/
OS_AXIS: 175
OS_OVR_VA: 20/
OD_SPHERE: -5.25
OD_AXIS: 165
OD_SPHERE: -5.50
OD_OVR_VA: 20/
OD_AXIS: 169
OS_AXIS: 177
OD_CYLINDER: +0.75
OS_CYLINDER: +0.75
OS_OVR_VA: 20/
OS_SPHERE: -5.50
OS_SPHERE: -5.00

## 2025-03-03 ASSESSMENT — SUPERFICIAL PUNCTATE KERATITIS (SPK)
OS_SPK: 2+
OD_SPK: 2+

## 2025-03-03 ASSESSMENT — VISUAL ACUITY
OD_BCVA: 20/25
OS_BCVA: 20/25

## 2025-03-03 ASSESSMENT — TONOMETRY
OS_IOP_MMHG: 20
OD_IOP_MMHG: 20

## 2025-03-03 ASSESSMENT — CONFRONTATIONAL VISUAL FIELD TEST (CVF)
OD_FINDINGS: FULL
OS_FINDINGS: FULL